# Patient Record
Sex: MALE
[De-identification: names, ages, dates, MRNs, and addresses within clinical notes are randomized per-mention and may not be internally consistent; named-entity substitution may affect disease eponyms.]

---

## 2018-07-24 ENCOUNTER — HOSPITAL ENCOUNTER (INPATIENT)
Dept: HOSPITAL 5 - ED | Age: 48
LOS: 4 days | Discharge: HOME | DRG: 280 | End: 2018-07-28
Attending: INTERNAL MEDICINE | Admitting: INTERNAL MEDICINE
Payer: MEDICAID

## 2018-07-24 DIAGNOSIS — E78.5: ICD-10-CM

## 2018-07-24 DIAGNOSIS — E66.9: ICD-10-CM

## 2018-07-24 DIAGNOSIS — Z82.49: ICD-10-CM

## 2018-07-24 DIAGNOSIS — I21.4: Primary | ICD-10-CM

## 2018-07-24 DIAGNOSIS — D72.829: ICD-10-CM

## 2018-07-24 DIAGNOSIS — I50.21: ICD-10-CM

## 2018-07-24 DIAGNOSIS — Z86.73: ICD-10-CM

## 2018-07-24 DIAGNOSIS — Z88.0: ICD-10-CM

## 2018-07-24 DIAGNOSIS — E11.9: ICD-10-CM

## 2018-07-24 DIAGNOSIS — I11.0: ICD-10-CM

## 2018-07-24 DIAGNOSIS — Z79.82: ICD-10-CM

## 2018-07-24 DIAGNOSIS — Z79.899: ICD-10-CM

## 2018-07-24 DIAGNOSIS — R94.31: ICD-10-CM

## 2018-07-24 DIAGNOSIS — I48.91: ICD-10-CM

## 2018-07-24 DIAGNOSIS — I25.5: ICD-10-CM

## 2018-07-24 LAB
ALBUMIN SERPL-MCNC: 3.8 G/DL (ref 3.9–5)
ALT SERPL-CCNC: 19 UNITS/L (ref 7–56)
APTT BLD: 39.5 SEC. (ref 24.2–36.6)
BASOPHILS # (AUTO): 0 K/MM3 (ref 0–0.1)
BASOPHILS NFR BLD AUTO: 0.2 % (ref 0–1.8)
BENZODIAZEPINES SCREEN,URINE: (no result)
BILIRUB UR QL STRIP: (no result)
BLOOD UR QL VISUAL: (no result)
BUN SERPL-MCNC: 15 MG/DL (ref 9–20)
BUN/CREAT SERPL: 14 %
CALCIUM SERPL-MCNC: 9.1 MG/DL (ref 8.4–10.2)
EOSINOPHIL # BLD AUTO: 0 K/MM3 (ref 0–0.4)
EOSINOPHIL NFR BLD AUTO: 0.3 % (ref 0–4.3)
HCT VFR BLD CALC: 47.2 % (ref 35.5–45.6)
HCT VFR BLD CALC: 47.8 % (ref 35.5–45.6)
HDLC SERPL-MCNC: 43 MG/DL (ref 40–59)
HEMOLYSIS INDEX: 11
HGB BLD-MCNC: 16.5 GM/DL (ref 11.8–15.2)
HGB BLD-MCNC: 16.8 GM/DL (ref 11.8–15.2)
INR PPP: 1.09 (ref 0.87–1.13)
LYMPHOCYTES # BLD AUTO: 0.7 K/MM3 (ref 1.2–5.4)
LYMPHOCYTES NFR BLD AUTO: 6.2 % (ref 13.4–35)
MCH RBC QN AUTO: 35 PG (ref 28–32)
MCHC RBC AUTO-ENTMCNC: 35 % (ref 32–34)
MCV RBC AUTO: 99 FL (ref 84–94)
METHADONE SCREEN,URINE: (no result)
MONOCYTES # (AUTO): 0.8 K/MM3 (ref 0–0.8)
MONOCYTES % (AUTO): 6.9 % (ref 0–7.3)
MUCOUS THREADS #/AREA URNS HPF: (no result) /HPF
OPIATE SCREEN,URINE: (no result)
PH UR STRIP: 5 [PH] (ref 5–7)
PLATELET # BLD: 195 K/MM3 (ref 140–440)
PLATELET # BLD: 201 K/MM3 (ref 140–440)
PROT UR STRIP-MCNC: >500 MG/DL
RBC # BLD AUTO: 4.84 M/MM3 (ref 3.65–5.03)
RBC #/AREA URNS HPF: 1 /HPF (ref 0–6)
UROBILINOGEN UR-MCNC: 2 MG/DL (ref ?–2)
WBC #/AREA URNS HPF: 1 /HPF (ref 0–6)

## 2018-07-24 PROCEDURE — 93005 ELECTROCARDIOGRAM TRACING: CPT

## 2018-07-24 PROCEDURE — 85014 HEMATOCRIT: CPT

## 2018-07-24 PROCEDURE — 36415 COLL VENOUS BLD VENIPUNCTURE: CPT

## 2018-07-24 PROCEDURE — 83880 ASSAY OF NATRIURETIC PEPTIDE: CPT

## 2018-07-24 PROCEDURE — 85049 AUTOMATED PLATELET COUNT: CPT

## 2018-07-24 PROCEDURE — 80053 COMPREHEN METABOLIC PANEL: CPT

## 2018-07-24 PROCEDURE — 84484 ASSAY OF TROPONIN QUANT: CPT

## 2018-07-24 PROCEDURE — 85610 PROTHROMBIN TIME: CPT

## 2018-07-24 PROCEDURE — 80061 LIPID PANEL: CPT

## 2018-07-24 PROCEDURE — 85520 HEPARIN ASSAY: CPT

## 2018-07-24 PROCEDURE — 81001 URINALYSIS AUTO W/SCOPE: CPT

## 2018-07-24 PROCEDURE — 71275 CT ANGIOGRAPHY CHEST: CPT

## 2018-07-24 PROCEDURE — 85025 COMPLETE CBC W/AUTO DIFF WBC: CPT

## 2018-07-24 PROCEDURE — G0480 DRUG TEST DEF 1-7 CLASSES: HCPCS

## 2018-07-24 PROCEDURE — 93010 ELECTROCARDIOGRAM REPORT: CPT

## 2018-07-24 PROCEDURE — 85379 FIBRIN DEGRADATION QUANT: CPT

## 2018-07-24 PROCEDURE — 85018 HEMOGLOBIN: CPT

## 2018-07-24 PROCEDURE — 80307 DRUG TEST PRSMV CHEM ANLYZR: CPT

## 2018-07-24 PROCEDURE — 70450 CT HEAD/BRAIN W/O DYE: CPT

## 2018-07-24 PROCEDURE — 82962 GLUCOSE BLOOD TEST: CPT

## 2018-07-24 PROCEDURE — 93306 TTE W/DOPPLER COMPLETE: CPT

## 2018-07-24 PROCEDURE — 93458 L HRT ARTERY/VENTRICLE ANGIO: CPT

## 2018-07-24 PROCEDURE — 85730 THROMBOPLASTIN TIME PARTIAL: CPT

## 2018-07-24 PROCEDURE — 80048 BASIC METABOLIC PNL TOTAL CA: CPT

## 2018-07-24 PROCEDURE — 84443 ASSAY THYROID STIM HORMONE: CPT

## 2018-07-24 PROCEDURE — C1894 INTRO/SHEATH, NON-LASER: HCPCS

## 2018-07-24 PROCEDURE — 80320 DRUG SCREEN QUANTALCOHOLS: CPT

## 2018-07-24 RX ADMIN — METOPROLOL TARTRATE SCH MG: 25 TABLET, FILM COATED ORAL at 23:15

## 2018-07-24 RX ADMIN — ACETAMINOPHEN PRN MG: 325 TABLET ORAL at 16:37

## 2018-07-24 RX ADMIN — Medication SCH ML: at 23:16

## 2018-07-24 NOTE — EMERGENCY DEPARTMENT REPORT
ED Chest Pain HPI





- General


Chief Complaint: Chest Pain


Stated Complaint: ALTERED MENTAL STATUS


Time Seen by Provider: 07/24/18 12:15


Source: patient, EMS


Mode of arrival: Stretcher


Limitations: Altered Mental Status





- History of Present Illness


Initial Comments: 





Patient said he has been having chest pain since last night but the chest pain 

has currently resolved. Patient is slow to processing information and answering 

questions.


MD Complaint: chest pain


-: Last night


Onset: during rest


Pain Location: substernal


Pain Radiation: none


Severity: moderate


Severity scale (0 -10): 7


Quality: heaviness, sharp


Consistency: intermittent


Improves With: nothing


Worsens With: nothing


re: denies: nausea, vomting


Other Symptoms: denies: cough


Treatments Prior to Arrival: none





- Related Data


 Home Medications











 Medication  Instructions  Recorded  Confirmed  Last Taken


 


Aspirin [Adult Low Dose Aspirin EC] 81 mg PO DAILY 07/24/18 07/24/18 07/24/18


 


Atorvastatin Calcium [Lipitor] 20 mg PO QDAY 07/24/18 07/24/18 07/24/18


 


Lisinopril/Hydrochlorothiazide 1 each PO QDAY 07/24/18 07/24/18 07/24/18





[Zestoretic 20-12.5 mg]    


 


hydrALAZINE [Apresoline] 25 mg PO TID 07/24/18 07/24/18 07/24/18











 Allergies











Allergy/AdvReac Type Severity Reaction Status Date / Time


 


Penicillins Allergy  Unknown Verified 07/24/18 12:29














Heart Score





- HEART Score


History: Highly suspicious


EKG: Non-specific


Age: 45-65


Risk factors: 1-2 risk factors


Troponin: > 3x normal limit


HEART Score: 7





- Critical Actions


Critical Actions: >7 pts:50-65% risk of adverse cardiac event. Early invasive 

measures





ED Review of Systems


ROS: 


Stated complaint: ALTERED MENTAL STATUS


Other details as noted in HPI





Comment: All other systems reviewed and negative


Constitutional: denies: chills, diaphoresis, fever


Eyes: denies: eye pain, eye discharge


ENT: denies: ear pain, throat pain, dental pain


Respiratory: denies: cough, orthopnea, shortness of breath


Cardiovascular: chest pain.  denies: palpitations, dyspnea on exertion


Endocrine: no symptoms reported


Gastrointestinal: denies: abdominal pain, nausea, vomiting


Genitourinary: denies: urgency, dysuria, frequency


Skin: denies: rash, lesions


Neurological: denies: headache, weakness


Psychiatric: denies: anxiety, depression


Hematological/Lymphatic: denies: easy bleeding, easy bruising





ED Past Medical Hx





- Medications


Home Medications: 


 Home Medications











 Medication  Instructions  Recorded  Confirmed  Last Taken  Type


 


Aspirin [Adult Low Dose Aspirin EC] 81 mg PO DAILY 07/24/18 07/24/18 07/24/18 

History


 


Atorvastatin Calcium [Lipitor] 20 mg PO QDAY 07/24/18 07/24/18 07/24/18 History


 


Lisinopril/Hydrochlorothiazide 1 each PO QDAY 07/24/18 07/24/18 07/24/18 History





[Zestoretic 20-12.5 mg]     


 


hydrALAZINE [Apresoline] 25 mg PO TID 07/24/18 07/24/18 07/24/18 History














ED Physical Exam





- General


Limitations: Altered Mental Status


General appearance: alert, in no apparent distress





- Head


Head exam: Absent: atraumatic, normocephalic, normal inspection





- Eye


Eye exam: Present: normal appearance, PERRL, EOMI


Pupils: Present: normal accommodation





- ENT


ENT exam: Present: normal exam, normal orophraynx, mucous membranes moist





- Neck


Neck exam: Present: normal inspection, full ROM.  Absent: tenderness





- Respiratory


Respiratory exam: Present: normal lung sounds bilaterally.  Absent: respiratory 

distress, wheezes, rales, rhonchi, stridor





- Cardiovascular


Cardiovascular Exam: Present: regular rate, normal rhythm, normal heart sounds





- GI/Abdominal


GI/Abdominal exam: Present: soft, normal bowel sounds.  Absent: distended, 

tenderness, guarding, rebound





- Extremities Exam


Extremities exam: Present: normal inspection, full ROM, normal capillary refill





- Back Exam


Back exam: Present: normal inspection, full ROM.  Absent: tenderness





- Neurological Exam


Neurological exam: Present: alert, oriented X3, CN II-XII intact





- Psychiatric


Psychiatric exam: Present: normal affect, normal mood





- Skin


Skin exam: Present: warm, dry, intact, normal color.  Absent: rash





ED Course


 Vital Signs











  07/24/18 07/24/18 07/24/18





  12:15 12:16 12:20


 


Temperature   98 F


 


Pulse Rate  78 75


 


Respiratory 16 13 16





Rate   


 


Blood Pressure  115/87 115/87


 


O2 Sat by Pulse 98 95 96





Oximetry   














  07/24/18 07/24/18 07/24/18





  12:44 13:00 13:30


 


Temperature   


 


Pulse Rate 88 83 


 


Respiratory 11 L 22 19





Rate   


 


Blood Pressure 123/85 111/79 105/87


 


O2 Sat by Pulse 95 94 95





Oximetry   














- Reevaluation(s)


Reevaluation #1: 





07/24/18 14:21


Dr Schmidt the interventional cardiologist on call was immediately consulted. 

He came to the ED and after evaluating the patient, he recommend Heparin drip 

and admission to hospital medicine. I discussed patient care with the 

hospitalist on call Dr Cartwright and he will admit patient to the hospital for 

further evaluation and management.





NIKKI score





- Nikki Score


Age > 65: (0) No


Aspirin use within the Past 7 Days: (0) No


3 or more CAD Risk Factors: (1) Yes


2 or more Angina events in past 24 hrs: (1) Yes


Known CAD with more than 50% Stenosis: (0) No


Elevated Cardiac Markers: (1) Yes


ST Deviation Greater than 0.5mm: (1) Yes


NIKKI Score: 4





ED Medical Decision Making





- Lab Data


Result diagrams: 


 07/24/18 12:49





 07/24/18 12:54





- EKG Data


-: EKG Interpreted by Me


Rate: normal (72)





- EKG Data


When compared to previous EKG there are: previous EKG unavailable


Interpretation: nonspecific ST-T wave ginger





07/24/18 12:43


Atrial Fibrillation, ST elevation in the inferior lateral leads with Q waves. 

EKG was sent to the Cardiologist on call Dr Schmidt who agreed with the reading.


Critical Care Time: Yes


Critical care time in (mins) excluding proc time.: 50


Critical care attestation.: 


If time is entered above; I have spent that time in minutes in the direct care 

of this critically ill patient, excluding procedure time.








ED Disposition


Clinical Impression: 


 NSTEMI (non-ST elevated myocardial infarction), Elevated troponin level





Chest pain


Qualifiers:


 Chest pain type: unspecified Qualified Code(s): R07.9 - Chest pain, unspecified





Altered mental status


Qualifiers:


 Altered mental status type: unspecified Qualified Code(s): R41.82 - Altered 

mental status, unspecified





Disposition: DC-09 OP ADMIT IP TO THIS HOSP


Is pt being admited?: Yes


Does the pt Need Aspirin: Yes


Condition: Stable


Instructions:  Chest Pain (ED)


Referrals: 


PRIMARY CARE,MD [Primary Care Provider] - 3-5 Days


Time of Disposition: 12:53

## 2018-07-24 NOTE — CAT SCAN REPORT
FINAL REPORT



EXAM:  CT ANGIO CHEST



HISTORY:  dypsnea 



TECHNIQUE:  CT examination of the chest with IV contrast



CT angiographic 2D and thick slab 3D image post-processing



PRIORS:  None.



FINDINGS:  

Scattered calcified granulomas right lung. Cardiac size at upper

limits of normal. Coronary artery calcification. No evidence of

pericardial effusion. 



Intact normal caliber thoracic aorta. Normal-appearing esophagus.

Small hiatal hernia. Otherwise normal visible portion of stomach.

No hilar mass or mediastinal adenopathy. 



The visualized pulmonary arteries are diffusely patent

bilaterally. There is no filling defect to suggest PE. 



No pneumothorax or pleural effusion. No focal pulmonary

consolidation. No lung mass. No noncalcified pulmonary nodule.

Degenerative change in the regional skeleton. No evidence of

acute fracture or vertebral compression. 



IMPRESSION:  

Coronary artery calcification



No CT evidence of PE or acute cardiopulmonary disease

## 2018-07-24 NOTE — CAT SCAN REPORT
CT HEAD WITHOUT CONTRAST:



HISTORY:  Altered mental status.



TECHNIQUE:  Sequential 2.5mm CT images.



COMPARISON: none.



FINDINGS:



Cerebral Parenchyma: Within normal limits.



Cerebellum:  Within normal limits.



Brainstem:  Within normal limits.



Ventricles: Normal.



Sella:  Normal.



Extra-axial spaces:  Normal.



Basal Cisterns:  Normal.



Intracranial Hemorrhage:  None.



Midline Shift:  None.



Calvarium: Normal.



Sinuses: Normal.



Mastoid Air Cells:  Normal.



Visualized Orbits:  Normal.







IMPRESSION:

Cranial CT scan within normal limits.

## 2018-07-24 NOTE — HISTORY AND PHYSICAL REPORT
History of Present Illness


Chief complaint: 





My chest hurts





History of present illness: 


47 YO Male with Obesity, HTN, HLD, DM, CVA presents to ED for evaluation. Pt 

states that he has experienced pain in his chest over the past 3 days, with 

worsening symptoms over the past 1 day. Pt states that pain is 6/10, substernal

, nonradiating, burning, not worsened with exertion, not relieved with rest. Pt 

seen and evaluated in ED. Cardiology consulted in ED for suspected STEMI. Pt 

seen and evaluated in ED and found to have evidence of ACS as well as CHF, and 

Atrial Fib. Pt admitted to telemetry and started on therapeutic 

anticoagulation. Pt denies fever, chills, palpitations, NVD, syncope, shortness 

of breath, prolonged travel/immobility, individual/family history of DVT/PE, 

unilateral leg swelling, calf pain, productive cough, hemoptysis, or recent ill 

contacts. 








Past History


Past Medical History: diabetes, hypertension, hyperlipidemia, stroke


Past Surgical History: No surgical history, Other (reviewed)


Social history: single.  denies: smoking, alcohol abuse, prescription drug abuse


Family history: hypertension





Medications and Allergies


 Allergies











Allergy/AdvReac Type Severity Reaction Status Date / Time


 


Penicillins Allergy  Unknown Verified 07/24/18 12:29











 Home Medications











 Medication  Instructions  Recorded  Confirmed  Last Taken  Type


 


Aspirin [Adult Low Dose Aspirin EC] 81 mg PO DAILY 07/24/18 07/24/18 07/24/18 

History


 


Atorvastatin Calcium [Lipitor] 20 mg PO QDAY 07/24/18 07/24/18 07/24/18 History


 


Lisinopril/Hydrochlorothiazide 1 each PO QDAY 07/24/18 07/24/18 07/24/18 History





[Zestoretic 20-12.5 mg]     


 


hydrALAZINE [Apresoline] 25 mg PO TID 07/24/18 07/24/18 07/24/18 History














Review of Systems


Constitutional: no weight loss, no weight gain, no fever, no chills


Ears, nose, mouth and throat: no ear pain, no ear discharge, no tinnitis, no 

decreased hearing, no nose pain


Cardiovascular: chest pain, no orthopnea, no palpitations, no shortness of 

breath, no dyspnea on exertion


Respiratory: no cough, no cough with sputum, no excessive sputum, no hemoptysis


Gastrointestinal: no nausea, no vomiting, no diarrhea


Genitourinary Male: no hematuria, no flank pain, no discharge, no urinary 

frequency, no urinary hesitancy


Rectal: no pain, no incontinence, no bleeding


Musculoskeletal: no neck stiffness, no neck pain, no shooting arm pain, no arm 

numbness/tingling, no low back pain


Integumentary: no rash, no pruritis, no redness, no sores, no wounds


Neurological: no paralysis, no weakness, no parathesias, no numbness, no 

tingling, no seizures


Psychiatric: no anxiety, no memory loss, no change in sleep habits, no sleep 

disturbances, no insomnia, no hypersomnia


Endocrine: no cold intolerance, no heat intolerance, no polyphagia, no 

excessive thirst, no polydipsia, no polyuria, no nocturia


Hematologic/Lymphatic: no easy bruising, no easy bleeding, no lymphadenopathy, 

no lymphedema


Allergic/Immunologic: no urticaria, no allergic rhinitis, no wheezing, no 

persistent infections, no anaphylaxis





Exam





- Constitutional


General appearance: Present: mild distress





- EENT


Eyes: Present: PERRL


ENT: hearing intact, clear oral mucosa





- Neck


Neck: Present: supple, normal ROM





- Respiratory


Respiratory effort: normal


Respiratory: bilateral: CTA





- Cardiovascular


Heart Sounds: Present: S1 & S2.  Absent: rub, click





- Extremities


Extremities: pulses symmetrical, No edema


Peripheral Pulses: within normal limits





- Abdominal


General gastrointestinal: Present: soft, non-tender, non-distended, normal 

bowel sounds


Male genitourinary: Present: normal





- Integumentary


Integumentary: Present: clear, warm, dry





- Musculoskeletal


Musculoskeletal: gait normal, strength equal bilaterally





- Psychiatric


Psychiatric: appropriate mood/affect, intact judgment & insight





- Neurologic


Neurologic: CNII-XII intact, moves all extremities





Results





- Labs


CBC & Chem 7: 


 07/24/18 15:57





 07/24/18 12:54


Labs: 


 Abnormal lab results











  07/24/18 Range/Units





  12:07 


 


POC Glucose  154 H  ()  














Assessment and Plan





- Patient Problems


(1) ACS (acute coronary syndrome)


Current Visit: Yes   Status: Acute   


Plan to address problem: 


Admit to telemetry, serial cardiac enzymes, ekg, morphine, supplemental oxygen, 

nitro, aspirin, echo, 








(2) Heart failure


Current Visit: Yes   Status: Acute   


Qualifiers: 


   Heart failure type: systolic 


Plan to address problem: 


Admit to telemetry, monitor uop q shift, supportive care, serial cardiac enzymes

, echo, strict I/o, monitor uop q shift, supplemental oxygen, d dimer, bnp, 

chest x ray








(3) Diabetes


Current Visit: Yes   Status: Chronic   


Plan to address problem: 


ADA diet, insulin, accu check








(4) HTN (hypertension)


Current Visit: Yes   Status: Chronic   


Qualifiers: 


   Hypertension type: essential hypertension   Qualified Code(s): I10 - 

Essential (primary) hypertension   


Plan to address problem: 


Monitor bp q shift, 








(5) Atrial fibrillation


Current Visit: Yes   Status: Acute   


Qualifiers: 


   Atrial fibrillation type: persistent   Qualified Code(s): I48.1 - Persistent 

atrial fibrillation   


Plan to address problem: 


Cardiology consulted in ED, therapeutic anticoagulation, echo, thyroid panel, 

supportive care. 








(6) DVT prophylaxis


Current Visit: Yes   Status: Acute   


Plan to address problem: 


SCD to BLE while in bed

## 2018-07-24 NOTE — CONSULTATION
History of Present Illness


Consult date: 07/24/18


Requesting physician: AMANDA RODRIGUEZ


Consult reason: atrial fibrillation, chest pain, elevated troponin


History of present illness: 


The pt is a 49 Yo male with a past medical history significant for HTN, HLP, DM

, multiple TIAs. He is previously unknown to our practice. His PCP is Dr. Chavez. 

He presented with complaints of intermittent chest pain for 3 days prior to 

arrival. He describes the pain as a nonexertional, nonradiating burning pain 

which he thought was secondary to indigestion. Pt provides no additional 

details. He was noted to have some memory issues in ED and thus head CT was 

ordered. He denies any SOB, palpitations, n/v, diaphoresis, dizziness or 

syncope. He denies any prior cardiac issues, including CAD, AMI, HF or cardiac 

arrhythmia. Following arrival, code STEMI was activated due to abnormal ECG. Pt 

was evaluated in ED by Dr. Schmidt and code STEMI cancelled. Will plan for 

coronary angiography in AM. 








Past History


Past Medical History: diabetes, hypertension, hyperlipidemia, other (TIAs)


Social history: denies: smoking, alcohol abuse, prescription drug abuse





Medications and Allergies


 Allergies











Allergy/AdvReac Type Severity Reaction Status Date / Time


 


Penicillins Allergy  Unknown Verified 07/24/18 12:29











 Home Medications











 Medication  Instructions  Recorded  Confirmed  Last Taken  Type


 


Aspirin [Adult Low Dose Aspirin EC] 81 mg PO DAILY 07/24/18 07/24/18 07/24/18 

History


 


Atorvastatin Calcium [Lipitor] 20 mg PO QDAY 07/24/18 07/24/18 07/24/18 History


 


Lisinopril/Hydrochlorothiazide 1 each PO QDAY 07/24/18 07/24/18 07/24/18 History





[Zestoretic 20-12.5 mg]     


 


hydrALAZINE [Apresoline] 25 mg PO TID 07/24/18 07/24/18 07/24/18 History











Active Meds: 


Active Medications





Acetaminophen (Tylenol)  650 mg PO Q4H PRN


   PRN Reason: Pain MILD(1-3)/Fever >100.5/HA


Ondansetron HCl (Zofran)  4 mg IV Q8H PRN


   PRN Reason: Nausea And Vomiting


Sodium Chloride (Sodium Chloride Flush Syringe 10 Ml)  10 ml IV BID ALICIA


Sodium Chloride (Sodium Chloride Flush Syringe 10 Ml)  10 ml IV PRN PRN


   PRN Reason: LINE FLUSH











Review of Systems


Constitutional: no weight loss, no weight gain, no fever, no chills, no sweats


Ears, nose, mouth and throat: no ear pain, no nose pain, no sinus pressure, no 

sinus pain


Cardiovascular: chest pain, no orthopnea, no palpitations, no rapid/irregular 

heart beat, no edema, no syncope, no lightheadedness, no shortness of breath, 

no dyspnea on exertion


Respiratory: no cough, no shortness of breath, no dyspnea on exertion, no 

congestion, no wheezing, no pain on inspiration


Gastrointestinal: no abdominal pain, no nausea, no vomiting, no diarrhea, no 

constipation, no change in bowel habits


Genitourinary Male: no dysuria, no hematuria, no flank pain, no discharge, no 

urinary frequency, no urinary hesitancy


Musculoskeletal: no neck stiffness, no neck pain, no shooting arm pain, no arm 

numbness/tingling, no low back pain, no shooting leg pain, no leg numbness/

tingling, no redness of joints


Integumentary: no rash, no pruritis, no redness, no sores, no wounds


Neurological: no head injury, no paralysis, no weakness, no parathesias, no 

numbness, no tingling, no seizures, no syncope


Psychiatric: no anxiety


Endocrine: no cold intolerance, no heat intolerance


Hematologic/Lymphatic: no easy bruising, no easy bleeding


Allergic/Immunologic: no urticaria, no wheezing





Physical Examination


 Vital Signs











Resp Pulse Ox


 


 16   98 


 


 07/24/18 12:15  07/24/18 12:15











General appearance: no acute distress


HEENT: Positive: PERRL, Normocephaly, Mucus Membranes Moist


Neck: Positive: neck supple, trachea midline


Cardiac: Positive: irregularly irregular, S1/S2


Lungs: Positive: clear to auscultation


Neuro: Positive: Grossly Intact


Abdomen: Positive: Soft.  Negative: Tender


Skin: Positive: Clear.  Negative: Rash, Wound


Musculoskeletal: No Fluid Collection, No Pain, Normal Range of Motion


Extremities: Absent: edema





Results





 07/24/18 15:57





 07/24/18 12:54


 Cardiac Enzymes











  07/24/18 Range/Units





  12:54 


 


AST  65 H  (5-40)  units/L








 Lipids











  07/24/18 Range/Units





  12:54 


 


Triglycerides  115  (2-149)  mg/dL


 


Cholesterol  183  ()  mg/dL


 


HDL Cholesterol  43  (40-59)  mg/dL


 


Cholesterol/HDL Ratio  4.25  %








 CBC











  07/24/18 Range/Units





  12:49 


 


WBC  12.1 H  (4.5-11.0)  K/mm3


 


RBC  4.84  (3.65-5.03)  M/mm3


 


Hgb  16.8 H  (11.8-15.2)  gm/dl


 


Hct  47.8 H  (35.5-45.6)  %


 


Plt Count  201  (140-440)  K/mm3


 


Lymph #  0.7 L  (1.2-5.4)  K/mm3


 


Mono #  0.8  (0.0-0.8)  K/mm3


 


Eos #  0.0  (0.0-0.4)  K/mm3


 


Baso #  0.0  (0.0-0.1)  K/mm3








 Comprehensive Metabolic Panel











  07/24/18 Range/Units





  12:54 


 


Sodium  137  (137-145)  mmol/L


 


Potassium  3.4 L  (3.6-5.0)  mmol/L


 


Chloride  99.3  ()  mmol/L


 


Carbon Dioxide  18 L  (22-30)  mmol/L


 


BUN  15  (9-20)  mg/dL


 


Creatinine  1.1  (0.8-1.5)  mg/dL


 


Glucose  174 H  ()  mg/dL


 


Calcium  9.1  (8.4-10.2)  mg/dL


 


AST  65 H  (5-40)  units/L


 


ALT  19  (7-56)  units/L


 


Alkaline Phosphatase  81  ()  units/L


 


Total Protein  7.5  (6.3-8.2)  g/dL


 


Albumin  3.8 L  (3.9-5)  g/dL














- Imaging and Cardiology


Echo: pending


EKG: report reviewed, image reviewed





EKG interpretations





- Telemetry


EKG Rhythm: Sinus Rhythm





- EKG


Sinus rhythms and dysrhythmias: sinus rhythm


Repolarization changes or abnormalities: ST or T wave suggestive of ischemia, 

ST suggestive of injury





Assessment and Plan


Head CT with NAF. Chest CTA ordered per primary. 


Initiate heparin gtt with initial bolus. 


Cont to trend Giuliana and repeat EKG in AM. 


Obtain echo. 


Initiate anti-ischemic regimen. 


Plan for coronary angiography in AM. Indications, potential risks and benefits 

of LHC reviewed with pt and he is agreeable to proceed. NPO after MN. 





The patient has been seen in conjunction with Dr. Schmidt who agrees with the 

assessment and plan of care. 








- Patient Problems


(1) Non-ST elevation MI (NSTEMI)


Current Visit: Yes   Status: Acute   





(2) Abnormal ECG


Current Visit: Yes   Status: Acute   





(3) Altered mental status


Current Visit: Yes   Status: Acute   


Qualifiers: 


   Altered mental status type: unspecified   Qualified Code(s): R41.82 - 

Altered mental status, unspecified   





(4) HTN (hypertension)


Current Visit: Yes   Status: Chronic   


Qualifiers: 


   Hypertension type: essential hypertension   Qualified Code(s): I10 - 

Essential (primary) hypertension   





(5) Hyperlipemia


Current Visit: Yes   Status: Chronic   





(6) Diabetes


Current Visit: Yes   Status: Chronic   





(7) Elevated d-dimer


Current Visit: Yes   Status: Acute   





(8) Leukocytosis


Current Visit: Yes   Status: Acute

## 2018-07-25 LAB
ALP BONE SERPL-MCNC: 0.1 U.I./ML (ref 0.3–0.7)
BASOPHILS # (AUTO): 0 K/MM3 (ref 0–0.1)
BASOPHILS NFR BLD AUTO: 0.3 % (ref 0–1.8)
BUN SERPL-MCNC: 17 MG/DL (ref 9–20)
BUN/CREAT SERPL: 13 %
CALCIUM SERPL-MCNC: 9.2 MG/DL (ref 8.4–10.2)
EOSINOPHIL # BLD AUTO: 0.1 K/MM3 (ref 0–0.4)
EOSINOPHIL NFR BLD AUTO: 0.9 % (ref 0–4.3)
HCT VFR BLD CALC: 43.3 % (ref 35.5–45.6)
HEMOLYSIS INDEX: 2
HGB BLD-MCNC: 15.1 GM/DL (ref 11.8–15.2)
INR PPP: 0.93 (ref 0.87–1.13)
LYMPHOCYTES # BLD AUTO: 1.1 K/MM3 (ref 1.2–5.4)
LYMPHOCYTES NFR BLD AUTO: 13.3 % (ref 13.4–35)
MCH RBC QN AUTO: 35 PG (ref 28–32)
MCHC RBC AUTO-ENTMCNC: 35 % (ref 32–34)
MCV RBC AUTO: 99 FL (ref 84–94)
MONOCYTES # (AUTO): 0.7 K/MM3 (ref 0–0.8)
MONOCYTES % (AUTO): 9 % (ref 0–7.3)
PLATELET # BLD: 205 K/MM3 (ref 140–440)
RBC # BLD AUTO: 4.37 M/MM3 (ref 3.65–5.03)

## 2018-07-25 PROCEDURE — B2111ZZ FLUOROSCOPY OF MULTIPLE CORONARY ARTERIES USING LOW OSMOLAR CONTRAST: ICD-10-PCS | Performed by: INTERNAL MEDICINE

## 2018-07-25 PROCEDURE — 4A023N7 MEASUREMENT OF CARDIAC SAMPLING AND PRESSURE, LEFT HEART, PERCUTANEOUS APPROACH: ICD-10-PCS | Performed by: INTERNAL MEDICINE

## 2018-07-25 PROCEDURE — B2151ZZ FLUOROSCOPY OF LEFT HEART USING LOW OSMOLAR CONTRAST: ICD-10-PCS | Performed by: INTERNAL MEDICINE

## 2018-07-25 RX ADMIN — HYDRALAZINE HYDROCHLORIDE SCH MG: 25 TABLET, FILM COATED ORAL at 21:43

## 2018-07-25 RX ADMIN — ACETAMINOPHEN PRN MG: 325 TABLET ORAL at 14:19

## 2018-07-25 RX ADMIN — HYDRALAZINE HYDROCHLORIDE SCH: 25 TABLET, FILM COATED ORAL at 18:50

## 2018-07-25 RX ADMIN — WARFARIN SODIUM SCH MG: 5 TABLET ORAL at 19:19

## 2018-07-25 RX ADMIN — ACETAMINOPHEN PRN MG: 325 TABLET ORAL at 08:29

## 2018-07-25 RX ADMIN — Medication SCH ML: at 14:21

## 2018-07-25 RX ADMIN — METOPROLOL TARTRATE SCH: 25 TABLET, FILM COATED ORAL at 19:24

## 2018-07-25 RX ADMIN — ENOXAPARIN SODIUM SCH MG: 100 INJECTION SUBCUTANEOUS at 19:21

## 2018-07-25 RX ADMIN — METOPROLOL TARTRATE SCH MG: 25 TABLET, FILM COATED ORAL at 21:44

## 2018-07-25 RX ADMIN — HYDRALAZINE HYDROCHLORIDE SCH MG: 25 TABLET, FILM COATED ORAL at 19:28

## 2018-07-25 RX ADMIN — Medication SCH ML: at 22:31

## 2018-07-25 RX ADMIN — ENOXAPARIN SODIUM SCH: 100 INJECTION SUBCUTANEOUS at 21:50

## 2018-07-25 RX ADMIN — ENOXAPARIN SODIUM SCH MG: 100 INJECTION SUBCUTANEOUS at 21:42

## 2018-07-25 NOTE — PROGRESS NOTE
Assessment and Plan


Assessment and plan: 





Late presentation acute myocardial infarction.  Cardiology reports that patient 

had Q waves in the anterior lateral leads suggestive of an old microinfarction.

  Continue aspirin, beta blocker and statin.  Resume ACE inhibitor.


 


Apical thrombus.  Anticoagulation with Lovenox bridge to Coumadin.





Proximal atrial fibrillation.  Continue anticoagulation.


  


Acute systolic heart failure.  Continue beta blocker and afterload reduction 

with hydralazine.





Obesity.  Patient will be counseled on lifestyle modifications and weight loss 

prior to discharge.





Hypertension.  Resume hydralazine and Zestoretic





Diabetes.  Continue Accu-Cheks and sliding scale insulin.





History TIAs





History


Interval history: 





Patient seen in the cath lab.





Hospitalist Physical





- Constitutional


Vitals: 


 











Temp Pulse Resp BP Pulse Ox


 


 97.9 F   83   20   106/77   96 


 


 07/25/18 07:45  07/25/18 07:45  07/25/18 07:45  07/25/18 07:45  07/25/18 07:45











General appearance: Present: no acute distress





- EENT


Eyes: Present: PERRL, EOM intact


ENT: hearing intact, clear oral mucosa, dentition normal





- Neck


Neck: Present: supple, normal ROM





- Respiratory


Respiratory effort: normal


Respiratory: bilateral: CTA





- Cardiovascular


Rhythm: regular


Heart Sounds: Present: S1 & S2.  Absent: gallop, rub





- Extremities


Extremities: no ischemia, No edema, Full ROM





- Abdominal


General gastrointestinal: soft, non-tender, non-distended, normal bowel sounds





- Integumentary


Integumentary: Present: clear, warm, dry





- Neurologic


Neurologic: CNII-XII intact, moves all extremities





Results





- Labs


CBC & Chem 7: 


 07/25/18 04:50





 07/25/18 04:50


Labs: 


 Laboratory Last Values











WBC  8.2 K/mm3 (4.5-11.0)   07/25/18  04:50    


 


RBC  4.37 M/mm3 (3.65-5.03)   07/25/18  04:50    


 


Hgb  15.1 gm/dl (11.8-15.2)   07/25/18  04:50    


 


Hct  43.3 % (35.5-45.6)   07/25/18  04:50    


 


MCV  99 fl (84-94)  H  07/25/18  04:50    


 


MCH  35 pg (28-32)  H  07/25/18  04:50    


 


MCHC  35 % (32-34)  H  07/25/18  04:50    


 


RDW  14.8 % (13.2-15.2)   07/25/18  04:50    


 


Plt Count  205 K/mm3 (140-440)   07/25/18  04:50    


 


Lymph % (Auto)  13.3 % (13.4-35.0)  L  07/25/18  04:50    


 


Mono % (Auto)  9.0 % (0.0-7.3)  H  07/25/18  04:50    


 


Eos % (Auto)  0.9 % (0.0-4.3)   07/25/18  04:50    


 


Baso % (Auto)  0.3 % (0.0-1.8)   07/25/18  04:50    


 


Lymph #  1.1 K/mm3 (1.2-5.4)  L  07/25/18  04:50    


 


Mono #  0.7 K/mm3 (0.0-0.8)   07/25/18  04:50    


 


Eos #  0.1 K/mm3 (0.0-0.4)   07/25/18  04:50    


 


Baso #  0.0 K/mm3 (0.0-0.1)   07/25/18  04:50    


 


Seg Neutrophils %  76.5 % (40.0-70.0)  H  07/25/18  04:50    


 


Seg Neutrophils #  6.3 K/mm3 (1.8-7.7)   07/25/18  04:50    


 


PT  12.9 Sec. (12.2-14.9)   07/25/18  04:50    


 


INR  0.93  (0.87-1.13)   07/25/18  04:50    


 


APTT  39.5 Sec. (24.2-36.6)  H  07/24/18  15:57    


 


D-Dimer  390.20 ng/mlDDU (0-234)  H  07/24/18  13:11    


 


Heparin Anti-Xa Level  0.10 U.I./ml (0.3-0.7)  L  07/25/18  04:50    


 


Sodium  139 mmol/L (137-145)   07/25/18  04:50    


 


Potassium  3.8 mmol/L (3.6-5.0)   07/25/18  04:50    


 


Chloride  101.0 mmol/L ()   07/25/18  04:50    


 


Carbon Dioxide  27 mmol/L (22-30)  D 07/25/18  04:50    


 


Anion Gap  15 mmol/L  07/25/18  04:50    


 


BUN  17 mg/dL (9-20)   07/25/18  04:50    


 


Creatinine  1.3 mg/dL (0.8-1.5)   07/25/18  04:50    


 


Estimated GFR  59 ml/min  07/25/18  04:50    


 


BUN/Creatinine Ratio  13 %  07/25/18  04:50    


 


Glucose  133 mg/dL ()  H  07/25/18  04:50    


 


POC Glucose  116  ()  H  07/25/18  05:23    


 


Calcium  9.2 mg/dL (8.4-10.2)   07/25/18  04:50    


 


Total Bilirubin  2.30 mg/dL (0.1-1.2)  H  07/24/18  12:54    


 


AST  65 units/L (5-40)  H  07/24/18  12:54    


 


ALT  19 units/L (7-56)   07/24/18  12:54    


 


Alkaline Phosphatase  81 units/L ()   07/24/18  12:54    


 


Troponin T  2.330 ng/mL (0.00-0.029)  H*  07/24/18  19:47    


 


NT-Pro-B Natriuret Pep  3102 pg/mL (0-450)  H  07/24/18  12:54    


 


Total Protein  7.5 g/dL (6.3-8.2)   07/24/18  12:54    


 


Albumin  3.8 g/dL (3.9-5)  L  07/24/18  12:54    


 


Albumin/Globulin Ratio  1.0 %  07/24/18  12:54    


 


Triglycerides  115 mg/dL (2-149)   07/24/18  12:54    


 


Cholesterol  183 mg/dL ()   07/24/18  12:54    


 


LDL Cholesterol Direct  129 mg/dL ()   07/24/18  12:54    


 


HDL Cholesterol  43 mg/dL (40-59)   07/24/18  12:54    


 


Cholesterol/HDL Ratio  4.25 %  07/24/18  12:54    


 


TSH  0.781 mlU/mL (0.270-4.200)   07/24/18  12:54    


 


Urine Color  Yellow  (Yellow)   07/24/18  13:30    


 


Urine Turbidity  Clear  (Clear)   07/24/18  13:30    


 


Urine pH  5.0  (5.0-7.0)   07/24/18  13:30    


 


Ur Specific Gravity  1.025  (1.003-1.030)   07/24/18  13:30    


 


Urine Protein  >500 mg/dL (Negative)   07/24/18  13:30    


 


Urine Glucose (UA)  50 mg/dL (Negative)   07/24/18  13:30    


 


Urine Ketones  20 mg/dL (Negative)   07/24/18  13:30    


 


Urine Blood  Neg  (Negative)   07/24/18  13:30    


 


Urine Nitrite  Neg  (Negative)   07/24/18  13:30    


 


Urine Bilirubin  Neg  (Negative)   07/24/18  13:30    


 


Urine Urobilinogen  2.0 mg/dL (<2.0)   07/24/18  13:30    


 


Ur Leukocyte Esterase  Neg  (Negative)   07/24/18  13:30    


 


Urine WBC (Auto)  1.0 /HPF (0.0-6.0)   07/24/18  13:30    


 


Urine RBC (Auto)  1.0 /HPF (0.0-6.0)   07/24/18  13:30    


 


Urine Mucus  Few /HPF  07/24/18  13:30    


 


Urine Opiates Screen  Presumptive negative   07/24/18  13:30    


 


Urine Methadone Screen  Presumptive negative   07/24/18  13:30    


 


Ur Barbiturates Screen  Presumptive negative   07/24/18  13:30    


 


Ur Phencyclidine Scrn  Presumptive negative   07/24/18  13:30    


 


Ur Amphetamines Screen  Presumptive negative   07/24/18  13:30    


 


U Benzodiazepines Scrn  Presumptive negative   07/24/18  13:30    


 


Urine Cocaine Screen  Presumptive negative   07/24/18  13:30    


 


U Marijuana (THC) Screen  Presumptive negative   07/24/18  13:30    


 


Drugs of Abuse Note  Disclamer   07/24/18  13:30    


 


Plasma/Serum Alcohol  < 0.01 % (0-0.07)   07/24/18  12:54

## 2018-07-25 NOTE — PROGRESS NOTE
Assessment and Plan


Late presentation acute myocardial infarction


Apical thrombus


Proximal atrial fibrillation


Acute systolic heart failure


Obesity


Hypertension


Diabetes


TIAs





Recommendations in view of patient's chest pain free apical thrombus this 

patient had Q waves in the anterior lateral leads suggestive of an old 

microinfarction will treat medically with aspirin Coumadin Lovenox bridging 

beta blocker statin











Subjective


Date of service: 07/25/18


Principal diagnosis: chest pain


Interval history: 


Patient is chest pain-free








Objective


 Vital Signs











  Temp Pulse Resp BP Pulse Ox


 


 07/25/18 07:45  97.9 F  83  20  106/77  96


 


 07/24/18 23:54  98.3 F  61  18  96/71  95


 


 07/24/18 22:00      96


 


 07/24/18 21:40   92 H   


 


 07/24/18 19:16  98.4 F  92 H  18  120/84  95


 


 07/24/18 17:37    16  


 


 07/24/18 16:37    16  


 


 07/24/18 16:30   81  22  127/82  96


 


 07/24/18 16:00   83  14  98/65  96


 


 07/24/18 15:30   73  21  104/64  96


 


 07/24/18 15:00   79  21  113/75  96


 


 07/24/18 14:30   78  12  136/103  98


 


 07/24/18 14:00    19  105/87  96


 


 07/24/18 13:30    19  105/87  95


 


 07/24/18 13:00   83  22  111/79  94


 


 07/24/18 12:44   88  11 L  123/85  95


 


 07/24/18 12:20  98 F  75  16  115/87  96


 


 07/24/18 12:16   78  13  115/87  95


 


 07/24/18 12:15    16   98














- Physical Examination


General: No Apparent Distress


HEENT: Positive: PERRL, Normocephaly, Mucus Membranes Moist


Neck: Positive: neck supple, trachea midline


Cardiac: Positive: Reg Rate and Rhythm


Lungs: Positive: clear to auscultation


Neuro: Positive: Grossly Intact


Abdomen: Positive: Soft.  Negative: Tender


Skin: Positive: Clear.  Negative: Rash, Wound


Musculoskeletal: No Fluid Collection, No Pain, Normal Range of Motion


Extremities: Absent: edema





- Labs and Meds


 Cardiac Enzymes











  07/24/18 Range/Units





  12:54 


 


AST  65 H  (5-40)  units/L








 Coagulation











  07/24/18 07/25/18 Range/Units





  15:57 04:50 


 


PT  14.7  12.9  (12.2-14.9)  Sec.


 


INR  1.09  0.93  (0.87-1.13)  


 


APTT  39.5 H   (24.2-36.6)  Sec.








 Lipids











  07/24/18 Range/Units





  12:54 


 


Triglycerides  115  (2-149)  mg/dL


 


Cholesterol  183  ()  mg/dL


 


HDL Cholesterol  43  (40-59)  mg/dL


 


Cholesterol/HDL Ratio  4.25  %








 CBC











  07/24/18 07/24/18 07/25/18 Range/Units





  12:49 15:57 04:50 


 


WBC  12.1 H   8.2  (4.5-11.0)  K/mm3


 


RBC  4.84   4.37  (3.65-5.03)  M/mm3


 


Hgb  16.8 H  16.5 H  15.1  (11.8-15.2)  gm/dl


 


Hct  47.8 H  47.2 H  43.3  (35.5-45.6)  %


 


Plt Count  201  195  205  (140-440)  K/mm3


 


Lymph #  0.7 L   1.1 L  (1.2-5.4)  K/mm3


 


Mono #  0.8   0.7  (0.0-0.8)  K/mm3


 


Eos #  0.0   0.1  (0.0-0.4)  K/mm3


 


Baso #  0.0   0.0  (0.0-0.1)  K/mm3








 Comprehensive Metabolic Panel











  07/24/18 07/25/18 Range/Units





  12:54 04:50 


 


Sodium  137  139  (137-145)  mmol/L


 


Potassium  3.4 L  3.8  (3.6-5.0)  mmol/L


 


Chloride  99.3  101.0  ()  mmol/L


 


Carbon Dioxide  18 L  27  D  (22-30)  mmol/L


 


BUN  15  17  (9-20)  mg/dL


 


Creatinine  1.1  1.3  (0.8-1.5)  mg/dL


 


Glucose  174 H  133 H  ()  mg/dL


 


Calcium  9.1  9.2  (8.4-10.2)  mg/dL


 


AST  65 H   (5-40)  units/L


 


ALT  19   (7-56)  units/L


 


Alkaline Phosphatase  81   ()  units/L


 


Total Protein  7.5   (6.3-8.2)  g/dL


 


Albumin  3.8 L   (3.9-5)  g/dL














- Imaging and Cardiology


EKG: report reviewed, image reviewed


Echo: report reviewed (EF 30-35% with anterior apical akinesis with apical 

thrombus)


Cardiac cath: report reviewed (left main.  LAD mid 100% circumflex large patent 

OM1 large.  RCA large patent)





- Telemetry


EKG Rhythm: Sinus Rhythm





- EKG


Sinus rhythms and dysrhythmias: sinus rhythm


Repolarization changes or abnormalities: ST or T wave suggestive of ischemia, 

ST suggestive of injury

## 2018-07-25 NOTE — CARDIAC CATHERIZATION REPORT
LEFT HEART CATHETERIZATION



CLINICAL INFORMATION:  A 48-year-old  gentleman with late presentation

of myocardial infarction with positive troponin.  Echocardiogram shows anterior

wall akinesis with apical thrombus.  Has history of CVA in the past with

obesity.  He is here for left heart catheterization.  The patient was done on

moderate sedation of started at 10:48 a.m. and finished at 10:58 a.m., 10

minutes of moderate sedation was given of 0.5 mg Versed and 25 mcg of fentanyl. 

Procedure was done via the right radial artery, sterile technique, local

anesthesia, 6-Bahamian radial sheath inserted.  Left system engaged with a JL3.5

catheter.



FINDINGS:  Left main is a large caliber vessel, patent, bifurcates into large

LAD.  Proximal is patent mid after the two large septals is 100%.  Diagonal 1 is

a small caliber vessel, it is patent.  Circumflex and AV groove is a large

caliber vessel, bifurcates to a large OM1 that has a long lateral branch and OM2

is a small to medium caliber vessel and patent.  Circ and AV groove is

small-to-medium caliber vessel and patent.  RCA engaged with JR4, is a large

dominant vessel, is patent. Mild luminal irregularities, bifurcates into medium

caliber PDA with multiple branches that are patent.  LV gram was done secondary

to apical thrombus.  5-Bahamian catheters were taken out guidewire, 6-Bahamian

radial sheath was discontinued.  Radial dressing applied.  No hematoma, no

bleeding.



SUMMARY:

1.  Left main patent, LAD mid 100% after a second septal.  Circumflex is large

and patent, goes to a large OM1 with a large long lateral branch is patent.  OM2

is patent.  RCA is a large dominant vessel, patent.  PDA patent.

2.  In view of late presentation MI with apical thrombus and is chest pain free,

we will treat medically for this late presentation.





DD: 07/25/2018 11:03

DT: 07/25/2018 11:19

JOB# [id number]  [id number]

KENNETH/FIORELLA

## 2018-07-26 LAB
HCT VFR BLD CALC: 46.5 % (ref 35.5–45.6)
HGB BLD-MCNC: 16.1 GM/DL (ref 11.8–15.2)
INR PPP: 0.95 (ref 0.87–1.13)
PLATELET # BLD: 230 K/MM3 (ref 140–440)

## 2018-07-26 RX ADMIN — HYDRALAZINE HYDROCHLORIDE SCH: 25 TABLET, FILM COATED ORAL at 08:40

## 2018-07-26 RX ADMIN — ASPIRIN SCH MG: 81 TABLET, CHEWABLE ORAL at 11:12

## 2018-07-26 RX ADMIN — ENOXAPARIN SODIUM SCH MG: 100 INJECTION SUBCUTANEOUS at 11:12

## 2018-07-26 RX ADMIN — Medication SCH ML: at 22:59

## 2018-07-26 RX ADMIN — WARFARIN SODIUM SCH MG: 5 TABLET ORAL at 17:44

## 2018-07-26 RX ADMIN — DIPHENHYDRAMINE HYDROCHLORIDE PRN MG: 25 CAPSULE ORAL at 17:52

## 2018-07-26 RX ADMIN — DIPHENHYDRAMINE HYDROCHLORIDE PRN MG: 25 CAPSULE ORAL at 11:14

## 2018-07-26 RX ADMIN — HYDROCHLOROTHIAZIDE SCH MG: 12.5 CAPSULE ORAL at 11:12

## 2018-07-26 RX ADMIN — LISINOPRIL SCH: 20 TABLET ORAL at 10:42

## 2018-07-26 RX ADMIN — HYDRALAZINE HYDROCHLORIDE SCH MG: 25 TABLET, FILM COATED ORAL at 22:57

## 2018-07-26 RX ADMIN — METOPROLOL TARTRATE SCH MG: 25 TABLET, FILM COATED ORAL at 22:59

## 2018-07-26 RX ADMIN — ENOXAPARIN SODIUM SCH MG: 100 INJECTION SUBCUTANEOUS at 22:58

## 2018-07-26 RX ADMIN — DIPHENHYDRAMINE HYDROCHLORIDE PRN MG: 25 CAPSULE ORAL at 23:00

## 2018-07-26 RX ADMIN — Medication SCH ML: at 11:13

## 2018-07-26 RX ADMIN — HYDRALAZINE HYDROCHLORIDE SCH MG: 25 TABLET, FILM COATED ORAL at 15:06

## 2018-07-26 RX ADMIN — METOPROLOL TARTRATE SCH: 25 TABLET, FILM COATED ORAL at 10:41

## 2018-07-26 NOTE — PROGRESS NOTE
Assessment and Plan


Assessment:


Late presentation acute myocardial infarction


Apical thrombus


Proximal atrial fibrillation


Acute systolic heart failure


ICMP


Obesity


Hypertension


Diabetes


TIAs





Plan:


Currently stable cardiac status. Cont present cardiac management, including 

coumadin with lovenox "bridging". 


Cardiac defibrillator recommended in setting of ICMP. Pt declines [employer] at 

this time and wishes to readdress [employer] candidacy as OP. 





The patient has been seen in conjunction with Dr. SANTO Flores who agrees with the 

assessment and plan of care. 








Subjective


Date of service: 07/26/18


Principal diagnosis: chest pain


Interval history: 


pt resting comfortably in bed, no current complaints. 








Objective





  Last Vital Signs











Temp  97.6 F   07/26/18 11:54


 


Pulse  78   07/26/18 11:54


 


Resp  21   07/26/18 11:54


 


BP  115/79   07/26/18 11:54


 


Pulse Ox  94   07/26/18 11:54

















- Physical Examination


General: No Apparent Distress


HEENT: Positive: PERRL, Normocephaly, Mucus Membranes Moist


Neck: Positive: neck supple, trachea midline


Cardiac: Positive: Reg Rate and Rhythm, S1/S2


Lungs: Positive: clear to auscultation


Neuro: Positive: Grossly Intact


Abdomen: Positive: Soft.  Negative: Tender


Skin: Positive: Clear.  Negative: Rash, Wound


Musculoskeletal: No Fluid Collection, No Pain, Normal Range of Motion


Extremities: Absent: edema





- Labs and Meds


 Coagulation











  07/26/18 Range/Units





  07:09 


 


PT  13.2  (12.2-14.9)  Sec.


 


INR  0.95  (0.87-1.13)  








 CBC











  07/26/18 Range/Units





  07:08 


 


Hgb  16.1 H  (11.8-15.2)  gm/dl


 


Hct  46.5 H  (35.5-45.6)  %


 


Plt Count  230  (140-440)  K/mm3














- Imaging and Cardiology


EKG: report reviewed, image reviewed


Echo: report reviewed (EF 30-35% with anterior apical akinesis with apical 

thrombus)


Cardiac cath: report reviewed (left main.  LAD mid 100% circumflex large patent 

OM1 large.  RCA large patent)





- EKG


Sinus rhythms and dysrhythmias: sinus rhythm


Repolarization changes or abnormalities: ST or T wave suggestive of ischemia, 

ST suggestive of injury

## 2018-07-26 NOTE — PROGRESS NOTE
Assessment and Plan


Assessment and plan: 





Late presentation acute myocardial infarction.  


Continue aspirin, beta blocker and statin.  Resume ACE inhibitor.


 


Apical thrombus.  Anticoagulation with Lovenox bridge to Coumadin.





Proximal atrial fibrillation.  Continue anticoagulation.


  


Acute systolic heart failure.  Continue beta blocker and afterload reduction 

with hydralazine.





Obesity.  Patient will be counseled on lifestyle modifications and weight loss 

prior to discharge.





Hypertension.  Resume hydralazine and Zestoretic





Diabetes.  Continue Accu-Cheks and sliding scale insulin.





History TIAs





History


Interval history: 





No new issues overnight.





Hospitalist Physical





- Constitutional


Vitals: 


 











Temp Pulse Resp BP Pulse Ox


 


 98.0 F   81   18   107/76   96 


 


 07/26/18 07:54  07/26/18 07:56  07/26/18 07:54  07/26/18 07:54  07/26/18 07:56











General appearance: Present: no acute distress





- EENT


Eyes: Present: PERRL, EOM intact


ENT: hearing intact, clear oral mucosa, dentition normal





- Neck


Neck: Present: supple, normal ROM





- Respiratory


Respiratory effort: normal


Respiratory: bilateral: CTA





- Cardiovascular


Rhythm: regular


Heart Sounds: Present: S1 & S2.  Absent: gallop, rub





- Extremities


Extremities: no ischemia, No edema, Full ROM





- Abdominal


General gastrointestinal: soft, non-tender, non-distended, normal bowel sounds





- Integumentary


Integumentary: Present: clear, warm, dry





- Neurologic


Neurologic: CNII-XII intact, moves all extremities





Results





- Labs


CBC & Chem 7: 


 07/26/18 07:08





 07/25/18 04:50


Labs: 


 Laboratory Last Values











WBC  8.2 K/mm3 (4.5-11.0)   07/25/18  04:50    


 


RBC  4.37 M/mm3 (3.65-5.03)   07/25/18  04:50    


 


Hgb  16.1 gm/dl (11.8-15.2)  H  07/26/18  07:08    


 


Hct  46.5 % (35.5-45.6)  H  07/26/18  07:08    


 


MCV  99 fl (84-94)  H  07/25/18  04:50    


 


MCH  35 pg (28-32)  H  07/25/18  04:50    


 


MCHC  35 % (32-34)  H  07/25/18  04:50    


 


RDW  14.8 % (13.2-15.2)   07/25/18  04:50    


 


Plt Count  230 K/mm3 (140-440)   07/26/18  07:08    


 


Lymph % (Auto)  13.3 % (13.4-35.0)  L  07/25/18  04:50    


 


Mono % (Auto)  9.0 % (0.0-7.3)  H  07/25/18  04:50    


 


Eos % (Auto)  0.9 % (0.0-4.3)   07/25/18  04:50    


 


Baso % (Auto)  0.3 % (0.0-1.8)   07/25/18  04:50    


 


Lymph #  1.1 K/mm3 (1.2-5.4)  L  07/25/18  04:50    


 


Mono #  0.7 K/mm3 (0.0-0.8)   07/25/18  04:50    


 


Eos #  0.1 K/mm3 (0.0-0.4)   07/25/18  04:50    


 


Baso #  0.0 K/mm3 (0.0-0.1)   07/25/18  04:50    


 


Seg Neutrophils %  76.5 % (40.0-70.0)  H  07/25/18  04:50    


 


Seg Neutrophils #  6.3 K/mm3 (1.8-7.7)   07/25/18  04:50    


 


PT  13.2 Sec. (12.2-14.9)   07/26/18  07:09    


 


INR  0.95  (0.87-1.13)   07/26/18  07:09    


 


APTT  39.5 Sec. (24.2-36.6)  H  07/24/18  15:57    


 


D-Dimer  390.20 ng/mlDDU (0-234)  H  07/24/18  13:11    


 


Heparin Anti-Xa Level  0.10 U.I./ml (0.3-0.7)  L  07/25/18  04:50    


 


Sodium  139 mmol/L (137-145)   07/25/18  04:50    


 


Potassium  3.8 mmol/L (3.6-5.0)   07/25/18  04:50    


 


Chloride  101.0 mmol/L ()   07/25/18  04:50    


 


Carbon Dioxide  27 mmol/L (22-30)  D 07/25/18  04:50    


 


Anion Gap  15 mmol/L  07/25/18  04:50    


 


BUN  17 mg/dL (9-20)   07/25/18  04:50    


 


Creatinine  1.3 mg/dL (0.8-1.5)   07/25/18  04:50    


 


Estimated GFR  59 ml/min  07/25/18  04:50    


 


BUN/Creatinine Ratio  13 %  07/25/18  04:50    


 


Glucose  133 mg/dL ()  H  07/25/18  04:50    


 


POC Glucose  112  ()  H  07/26/18  12:08    


 


Calcium  9.2 mg/dL (8.4-10.2)   07/25/18  04:50    


 


Total Bilirubin  2.30 mg/dL (0.1-1.2)  H  07/24/18  12:54    


 


AST  65 units/L (5-40)  H  07/24/18  12:54    


 


ALT  19 units/L (7-56)   07/24/18  12:54    


 


Alkaline Phosphatase  81 units/L ()   07/24/18  12:54    


 


Troponin T  2.330 ng/mL (0.00-0.029)  H*  07/24/18  19:47    


 


NT-Pro-B Natriuret Pep  3102 pg/mL (0-450)  H  07/24/18  12:54    


 


Total Protein  7.5 g/dL (6.3-8.2)   07/24/18  12:54    


 


Albumin  3.8 g/dL (3.9-5)  L  07/24/18  12:54    


 


Albumin/Globulin Ratio  1.0 %  07/24/18  12:54    


 


Triglycerides  115 mg/dL (2-149)   07/24/18  12:54    


 


Cholesterol  183 mg/dL ()   07/24/18  12:54    


 


LDL Cholesterol Direct  129 mg/dL ()   07/24/18  12:54    


 


HDL Cholesterol  43 mg/dL (40-59)   07/24/18  12:54    


 


Cholesterol/HDL Ratio  4.25 %  07/24/18  12:54    


 


TSH  0.781 mlU/mL (0.270-4.200)   07/24/18  12:54    


 


Urine Color  Yellow  (Yellow)   07/24/18  13:30    


 


Urine Turbidity  Clear  (Clear)   07/24/18  13:30    


 


Urine pH  5.0  (5.0-7.0)   07/24/18  13:30    


 


Ur Specific Gravity  1.025  (1.003-1.030)   07/24/18  13:30    


 


Urine Protein  >500 mg/dL (Negative)   07/24/18  13:30    


 


Urine Glucose (UA)  50 mg/dL (Negative)   07/24/18  13:30    


 


Urine Ketones  20 mg/dL (Negative)   07/24/18  13:30    


 


Urine Blood  Neg  (Negative)   07/24/18  13:30    


 


Urine Nitrite  Neg  (Negative)   07/24/18  13:30    


 


Urine Bilirubin  Neg  (Negative)   07/24/18  13:30    


 


Urine Urobilinogen  2.0 mg/dL (<2.0)   07/24/18  13:30    


 


Ur Leukocyte Esterase  Neg  (Negative)   07/24/18  13:30    


 


Urine WBC (Auto)  1.0 /HPF (0.0-6.0)   07/24/18  13:30    


 


Urine RBC (Auto)  1.0 /HPF (0.0-6.0)   07/24/18  13:30    


 


Urine Mucus  Few /HPF  07/24/18  13:30    


 


Urine Opiates Screen  Presumptive negative   07/24/18  13:30    


 


Urine Methadone Screen  Presumptive negative   07/24/18  13:30    


 


Ur Barbiturates Screen  Presumptive negative   07/24/18  13:30    


 


Ur Phencyclidine Scrn  Presumptive negative   07/24/18  13:30    


 


Ur Amphetamines Screen  Presumptive negative   07/24/18  13:30    


 


U Benzodiazepines Scrn  Presumptive negative   07/24/18  13:30    


 


Urine Cocaine Screen  Presumptive negative   07/24/18  13:30    


 


U Marijuana (THC) Screen  Presumptive negative   07/24/18  13:30    


 


Drugs of Abuse Note  Disclamer   07/24/18  13:30    


 


Plasma/Serum Alcohol  < 0.01 % (0-0.07)   07/24/18  12:54

## 2018-07-27 LAB — INR PPP: 1.02 (ref 0.87–1.13)

## 2018-07-27 RX ADMIN — WARFARIN SODIUM SCH MG: 5 TABLET ORAL at 17:31

## 2018-07-27 RX ADMIN — METOPROLOL TARTRATE SCH MG: 25 TABLET, FILM COATED ORAL at 21:35

## 2018-07-27 RX ADMIN — HYDROCHLOROTHIAZIDE SCH MG: 12.5 CAPSULE ORAL at 09:50

## 2018-07-27 RX ADMIN — Medication SCH ML: at 10:00

## 2018-07-27 RX ADMIN — METOPROLOL TARTRATE SCH MG: 25 TABLET, FILM COATED ORAL at 09:51

## 2018-07-27 RX ADMIN — LISINOPRIL SCH MG: 20 TABLET ORAL at 09:50

## 2018-07-27 RX ADMIN — HYDRALAZINE HYDROCHLORIDE SCH MG: 25 TABLET, FILM COATED ORAL at 21:34

## 2018-07-27 RX ADMIN — HYDRALAZINE HYDROCHLORIDE SCH: 25 TABLET, FILM COATED ORAL at 14:37

## 2018-07-27 RX ADMIN — ASPIRIN SCH MG: 81 TABLET, CHEWABLE ORAL at 09:50

## 2018-07-27 RX ADMIN — DIPHENHYDRAMINE HYDROCHLORIDE PRN MG: 25 CAPSULE ORAL at 14:36

## 2018-07-27 RX ADMIN — ENOXAPARIN SODIUM SCH MG: 100 INJECTION SUBCUTANEOUS at 09:50

## 2018-07-27 RX ADMIN — HYDRALAZINE HYDROCHLORIDE SCH MG: 25 TABLET, FILM COATED ORAL at 08:18

## 2018-07-27 RX ADMIN — ENOXAPARIN SODIUM SCH MG: 100 INJECTION SUBCUTANEOUS at 21:36

## 2018-07-27 RX ADMIN — DIPHENHYDRAMINE HYDROCHLORIDE PRN MG: 25 CAPSULE ORAL at 08:25

## 2018-07-27 NOTE — PROGRESS NOTE
Assessment and Plan


Assessment and plan: 





Late presentation acute myocardial infarction.  


Continue aspirin, beta blocker and statin.  Resume ACE inhibitor.


 


Apical thrombus.  Anticoagulation with Lovenox bridge to Coumadin.





Proximal atrial fibrillation.  Continue anticoagulation.


  


Acute systolic heart failure.  Continue beta blocker and afterload reduction 

with hydralazine.





Ischemic cardiomyopathy.  Cardiac defibrillator recommended in setting of ICMP. 

Pt declines LifeVest at this time and wishes to readdress AICD candidacy as OP. 





Obesity.  Patient will be counseled on lifestyle modifications and weight loss 

prior to discharge.





Hypertension.  Resume hydralazine and Zestoretic





Diabetes.  Continue Accu-Cheks and sliding scale insulin.





History TIAs





History


Interval history: 





No new issues overnight.





Hospitalist Physical





- Constitutional


Vitals: 


 











Temp Pulse Resp BP Pulse Ox


 


 97.6 F   76   20   108/74   98 


 


 07/27/18 08:22  07/27/18 09:51  07/27/18 08:31  07/27/18 09:51  07/27/18 08:31











General appearance: Present: no acute distress





- EENT


Eyes: Present: PERRL, EOM intact


ENT: hearing intact, clear oral mucosa, dentition normal





- Neck


Neck: Present: supple, normal ROM





- Respiratory


Respiratory effort: normal


Respiratory: bilateral: CTA





- Cardiovascular


Rhythm: regular


Heart Sounds: Present: S1 & S2.  Absent: gallop, rub





- Extremities


Extremities: no ischemia, No edema, Full ROM





- Abdominal


General gastrointestinal: soft, non-tender, non-distended, normal bowel sounds





- Integumentary


Integumentary: Present: clear, warm, dry





- Neurologic


Neurologic: CNII-XII intact, moves all extremities





Results





- Labs


CBC & Chem 7: 


 07/26/18 07:08





 07/25/18 04:50


Labs: 


 Laboratory Last Values











WBC  8.2 K/mm3 (4.5-11.0)   07/25/18  04:50    


 


RBC  4.37 M/mm3 (3.65-5.03)   07/25/18  04:50    


 


Hgb  16.1 gm/dl (11.8-15.2)  H  07/26/18  07:08    


 


Hct  46.5 % (35.5-45.6)  H  07/26/18  07:08    


 


MCV  99 fl (84-94)  H  07/25/18  04:50    


 


MCH  35 pg (28-32)  H  07/25/18  04:50    


 


MCHC  35 % (32-34)  H  07/25/18  04:50    


 


RDW  14.8 % (13.2-15.2)   07/25/18  04:50    


 


Plt Count  230 K/mm3 (140-440)   07/26/18  07:08    


 


Lymph % (Auto)  13.3 % (13.4-35.0)  L  07/25/18  04:50    


 


Mono % (Auto)  9.0 % (0.0-7.3)  H  07/25/18  04:50    


 


Eos % (Auto)  0.9 % (0.0-4.3)   07/25/18  04:50    


 


Baso % (Auto)  0.3 % (0.0-1.8)   07/25/18  04:50    


 


Lymph #  1.1 K/mm3 (1.2-5.4)  L  07/25/18  04:50    


 


Mono #  0.7 K/mm3 (0.0-0.8)   07/25/18  04:50    


 


Eos #  0.1 K/mm3 (0.0-0.4)   07/25/18  04:50    


 


Baso #  0.0 K/mm3 (0.0-0.1)   07/25/18  04:50    


 


Seg Neutrophils %  76.5 % (40.0-70.0)  H  07/25/18  04:50    


 


Seg Neutrophils #  6.3 K/mm3 (1.8-7.7)   07/25/18  04:50    


 


PT  13.9 Sec. (12.2-14.9)   07/27/18  06:25    


 


INR  1.02  (0.87-1.13)   07/27/18  06:25    


 


APTT  39.5 Sec. (24.2-36.6)  H  07/24/18  15:57    


 


D-Dimer  390.20 ng/mlDDU (0-234)  H  07/24/18  13:11    


 


Heparin Anti-Xa Level  0.10 U.I./ml (0.3-0.7)  L  07/25/18  04:50    


 


Sodium  139 mmol/L (137-145)   07/25/18  04:50    


 


Potassium  3.8 mmol/L (3.6-5.0)   07/25/18  04:50    


 


Chloride  101.0 mmol/L ()   07/25/18  04:50    


 


Carbon Dioxide  27 mmol/L (22-30)  D 07/25/18  04:50    


 


Anion Gap  15 mmol/L  07/25/18  04:50    


 


BUN  17 mg/dL (9-20)   07/25/18  04:50    


 


Creatinine  1.3 mg/dL (0.8-1.5)   07/25/18  04:50    


 


Estimated GFR  59 ml/min  07/25/18  04:50    


 


BUN/Creatinine Ratio  13 %  07/25/18  04:50    


 


Glucose  133 mg/dL ()  H  07/25/18  04:50    


 


POC Glucose  129  ()  H  07/27/18  06:53    


 


Calcium  9.2 mg/dL (8.4-10.2)   07/25/18  04:50    


 


Total Bilirubin  2.30 mg/dL (0.1-1.2)  H  07/24/18  12:54    


 


AST  65 units/L (5-40)  H  07/24/18  12:54    


 


ALT  19 units/L (7-56)   07/24/18  12:54    


 


Alkaline Phosphatase  81 units/L ()   07/24/18  12:54    


 


Troponin T  2.330 ng/mL (0.00-0.029)  H*  07/24/18  19:47    


 


NT-Pro-B Natriuret Pep  3102 pg/mL (0-450)  H  07/24/18  12:54    


 


Total Protein  7.5 g/dL (6.3-8.2)   07/24/18  12:54    


 


Albumin  3.8 g/dL (3.9-5)  L  07/24/18  12:54    


 


Albumin/Globulin Ratio  1.0 %  07/24/18  12:54    


 


Triglycerides  115 mg/dL (2-149)   07/24/18  12:54    


 


Cholesterol  183 mg/dL ()   07/24/18  12:54    


 


LDL Cholesterol Direct  129 mg/dL ()   07/24/18  12:54    


 


HDL Cholesterol  43 mg/dL (40-59)   07/24/18  12:54    


 


Cholesterol/HDL Ratio  4.25 %  07/24/18  12:54    


 


TSH  0.781 mlU/mL (0.270-4.200)   07/24/18  12:54    


 


Urine Color  Yellow  (Yellow)   07/24/18  13:30    


 


Urine Turbidity  Clear  (Clear)   07/24/18  13:30    


 


Urine pH  5.0  (5.0-7.0)   07/24/18  13:30    


 


Ur Specific Gravity  1.025  (1.003-1.030)   07/24/18  13:30    


 


Urine Protein  >500 mg/dL (Negative)   07/24/18  13:30    


 


Urine Glucose (UA)  50 mg/dL (Negative)   07/24/18  13:30    


 


Urine Ketones  20 mg/dL (Negative)   07/24/18  13:30    


 


Urine Blood  Neg  (Negative)   07/24/18  13:30    


 


Urine Nitrite  Neg  (Negative)   07/24/18  13:30    


 


Urine Bilirubin  Neg  (Negative)   07/24/18  13:30    


 


Urine Urobilinogen  2.0 mg/dL (<2.0)   07/24/18  13:30    


 


Ur Leukocyte Esterase  Neg  (Negative)   07/24/18  13:30    


 


Urine WBC (Auto)  1.0 /HPF (0.0-6.0)   07/24/18  13:30    


 


Urine RBC (Auto)  1.0 /HPF (0.0-6.0)   07/24/18  13:30    


 


Urine Mucus  Few /HPF  07/24/18  13:30    


 


Urine Opiates Screen  Presumptive negative   07/24/18  13:30    


 


Urine Methadone Screen  Presumptive negative   07/24/18  13:30    


 


Ur Barbiturates Screen  Presumptive negative   07/24/18  13:30    


 


Ur Phencyclidine Scrn  Presumptive negative   07/24/18  13:30    


 


Ur Amphetamines Screen  Presumptive negative   07/24/18  13:30    


 


U Benzodiazepines Scrn  Presumptive negative   07/24/18  13:30    


 


Urine Cocaine Screen  Presumptive negative   07/24/18  13:30    


 


U Marijuana (THC) Screen  Presumptive negative   07/24/18  13:30    


 


Drugs of Abuse Note  Disclamer   07/24/18  13:30    


 


Plasma/Serum Alcohol  < 0.01 % (0-0.07)   07/24/18  12:54

## 2018-07-27 NOTE — PROGRESS NOTE
Assessment and Plan


Assessment:


Late presentation acute myocardial infarction


Apical thrombus


Proximal atrial fibrillation with CVR 


Acute systolic heart failure - nearing/at euvolemia


ICMP - EF 30-35%


Obesity


Hypertension


Diabetes


TIAs





Plan:


Currently stable cardiac status. Cont present cardiac management, including 

coumadin with lovenox until tx INR of 2-3 is achieved. 


Cardiac defibrillator recommended in setting of ICMP. Pt declines LifeVest at 

this time and wishes to readdress AICD candidacy as OP. 


Pt may discharge home from cardiology standpoint with lovenox injections BID 

and PO coumadin if okay per primary and case management. 


Follow up in our [place] office for INR check and post-hospital follow up 

with Dr. Thomas on 8/1/2018 @ 10:00AM. 





The patient has been seen in conjunction with Dr. Thomas who agrees with the 

assessment and plan of care. 








Subjective


Date of service: 07/27/18


Principal diagnosis: chest pain


Interval history: 


pt resting comfortably in bed, no current complaints. 








Objective





  Last Vital Signs











Temp  98.6 F   07/27/18 11:41


 


Pulse  71   07/27/18 11:41


 


Resp  20   07/27/18 11:41


 


BP  99/66   07/27/18 11:41


 


Pulse Ox  97   07/27/18 11:41

















- Physical Examination


General: No Apparent Distress


HEENT: Positive: PERRL, Normocephaly, Mucus Membranes Moist


Neck: Positive: neck supple, trachea midline


Cardiac: Positive: Reg Rate and Rhythm, S1/S2


Lungs: Positive: Decreased Breath Sounds


Neuro: Positive: Grossly Intact


Abdomen: Positive: Soft.  Negative: Tender


Skin: Positive: Clear.  Negative: Rash, Wound


Musculoskeletal: No Fluid Collection, No Pain, Normal Range of Motion


Extremities: Absent: edema





- Labs and Meds


 Coagulation











  07/27/18 Range/Units





  06:25 


 


PT  13.9  (12.2-14.9)  Sec.


 


INR  1.02  (0.87-1.13)  














- Imaging and Cardiology


EKG: report reviewed, image reviewed


Echo: report reviewed (EF 30-35% with anterior apical akinesis with apical 

thrombus)


Cardiac cath: report reviewed (left main.  LAD mid 100% circumflex large patent 

OM1 large.  RCA large patent)





- Telemetry


EKG Rhythm: Sinus Rhythm





- EKG


Sinus rhythms and dysrhythmias: sinus rhythm


Repolarization changes or abnormalities: ST or T wave suggestive of ischemia, 

ST suggestive of injury

## 2018-07-28 VITALS — SYSTOLIC BLOOD PRESSURE: 111 MMHG | DIASTOLIC BLOOD PRESSURE: 80 MMHG

## 2018-07-28 LAB
HCT VFR BLD CALC: 46.5 % (ref 35.5–45.6)
HGB BLD-MCNC: 16.2 GM/DL (ref 11.8–15.2)
INR PPP: 1.06 (ref 0.87–1.13)
PLATELET # BLD: 247 K/MM3 (ref 140–440)

## 2018-07-28 RX ADMIN — DIPHENHYDRAMINE HYDROCHLORIDE PRN MG: 25 CAPSULE ORAL at 00:37

## 2018-07-28 RX ADMIN — HYDROCHLOROTHIAZIDE SCH MG: 12.5 CAPSULE ORAL at 10:19

## 2018-07-28 RX ADMIN — Medication SCH ML: at 00:36

## 2018-07-28 RX ADMIN — ENOXAPARIN SODIUM SCH MG: 100 INJECTION SUBCUTANEOUS at 10:21

## 2018-07-28 RX ADMIN — ASPIRIN SCH MG: 81 TABLET, CHEWABLE ORAL at 10:21

## 2018-07-28 RX ADMIN — LISINOPRIL SCH MG: 20 TABLET ORAL at 10:21

## 2018-07-28 RX ADMIN — Medication SCH ML: at 10:22

## 2018-07-28 RX ADMIN — DIPHENHYDRAMINE HYDROCHLORIDE PRN MG: 25 CAPSULE ORAL at 07:47

## 2018-07-28 RX ADMIN — HYDRALAZINE HYDROCHLORIDE SCH MG: 25 TABLET, FILM COATED ORAL at 10:20

## 2018-07-28 RX ADMIN — METOPROLOL TARTRATE SCH MG: 25 TABLET, FILM COATED ORAL at 10:19

## 2018-07-28 NOTE — DISCHARGE SUMMARY
Providers





- Providers


Date of Admission: 


07/24/18 12:52





Date of discharge: 07/28/18


Attending physician: 


LESIA MCELROY





 





07/24/18


Consult to Cardiac Rehabilitation [CONS] Routine 


   Reason For Exam: Phase I





07/24/18 12:52


Consult to Cardiology [CONS] Routine 


   Consulting Provider: KIARA CIFUENTES


   Reason For Exam: a fib/chf





07/25/18 11:14


Consult to Cardiac Rehabilitation [CONS] Routine 


   Reason For Exam: Cardiac Rehab Evaluation











Primary care physician: 


PRIMARY CARE MD








Hospitalization


Reason for admission: MI


Condition: Stable


Hospital course: 





The pt is a 49 Yo male with a past medical history significant for HTN, HLP, DM

, multiple TIAs.  His PCP is Dr. Chavez. He presented with complaints of 

intermittent chest pain for 3 days prior to arrival. He described the pain as a 

nonexertional, nonradiating burning pain which he thought was secondary to 

indigestion. Pt provides no additional details. He was noted to have some 

memory issues in ED and thus head CT was ordered.  CT scan of the head was 

found to be negative.  Patient also had a CTA of the chest that was negative.  

Patient underwent cardiac catheterization in which it was felt that patient 

suffered a late presentation acute MI and noted to have an apical thrombus.  

The patient had Q waves in the anterior lateral leads suggestive of an old 

microinfarction.  Cardiology recommended to treat medically with aspirin, 

Coumadin and Lovenox bridging as well as beta blocker and statin.  Dedicated 

discharge time 32 minutes.  Patient is to follow-up with [facility] on 

August 1 at 10 AM and have follow-up of INR.  Patient will be discharged with 

Lovenox as well as the other medications above.


Disposition: [id number] TO HOME OR SELFCARE


Time spent for discharge: 32





- Discharge Diagnoses


(1) ACS (acute coronary syndrome)


Status: Acute   





(2) Abnormal ECG


Status: Acute   





(3) Atrial fibrillation


Status: Acute   


Qualifiers: 


   Atrial fibrillation type: persistent   Qualified Code(s): I48.1 - Persistent 

atrial fibrillation   





(4) Chest pain


Status: Acute   


Qualifiers: 


   Chest pain type: unspecified   Qualified Code(s): R07.9 - Chest pain, 

unspecified   





(5) NSTEMI (non-ST elevated myocardial infarction)


Status: Acute   





(6) Diabetes


Status: Chronic   





(7) HTN (hypertension)


Status: Chronic   


Qualifiers: 


   Hypertension type: essential hypertension   Qualified Code(s): I10 - 

Essential (primary) hypertension   





(8) Hyperlipemia


Status: Chronic   





Core Measure Documentation





- Palliative Care


Palliative Care/ Comfort Measures: Not Applicable





- Core Measures


Any of the following diagnoses?: acute MI





- Acute MI Discharge Requirements


Aspirin at discharge: Yes


ACE/ARB for LVSD if EF <40%: Yes


Beta blocker at discharge: Yes


Statin for LDL = or >100 mg/dl on DC: Yes


Acute myocardial infarction comments: late presentation





Exam





- Constitutional


Vitals: 


 











Temp Pulse Resp BP Pulse Ox


 


 98.0 F   72   18   110/80   99 


 


 07/28/18 08:00  07/28/18 07:47  07/28/18 07:47  07/28/18 07:47  07/28/18 07:47











General appearance: Present: no acute distress, well-nourished





- EENT


Eyes: Present: PERRL


ENT: hearing intact, clear oral mucosa





- Neck


Neck: Present: supple, normal ROM





- Respiratory


Respiratory effort: normal


Respiratory: bilateral: CTA





- Cardiovascular


Heart Sounds: Present: S1 & S2.  Absent: rub, click





- Extremities


Extremities: pulses symmetrical, No edema


Peripheral Pulses: within normal limits





- Abdominal


General gastrointestinal: Present: soft, non-tender, non-distended, normal 

bowel sounds


Male genitourinary: Present: normal





- Integumentary


Integumentary: Present: clear, warm, dry





- Musculoskeletal


Musculoskeletal: gait normal, strength equal bilaterally





- Psychiatric


Psychiatric: appropriate mood/affect, intact judgment & insight





- Neurologic


Neurologic: CNII-XII intact, moves all extremities





Plan


Activity: advance as tolerated


Weight Bearing Status: Weight Bear as Tolerated


Diet: low fat, low cholesterol, low salt


Additional Instructions: Patient is to follow-up with [facility] on 

Wednesday August 1 and have recheck of INR.  Follow up in [place] office 

for INR check and post-hospital follow up with Dr. Thomas on 8/1/2018 @ 10:00AM.


Follow up with: 


PRIMARY CARE,MD [Primary Care Provider] - 3-5 Days


SLADE CARTER MD [Staff Physician] - 3 Days


Forms:  Warfarin Discharge Instruction


Prescriptions: 


Aspirin [Aspirin BABY CHEW TAB] 81 mg PO QDAY #30 tab.chew


AtorvaSTATin [Lipitor] 40 mg PO QHS #30 tablet


Enoxaparin [Lovenox] 100 mg SUB-Q Q12HR #10 syringe


hydrALAZINE [Apresoline TAB] 25 mg PO TID #90 tablet


Hydrochlorothiazide [HCTZ] 12.5 mg PO QDAY #30 capsule


Lisinopril [Zestril TAB] 20 mg PO QDAY #30 tablet


Metoprolol [Lopressor TAB] 12.5 mg PO BID #60 tablet


Warfarin [Coumadin] 5 mg PO DAILY@1700 #30 tablet


Warfarin [Coumadin] 7.5 mg PO QDAY #15 tablet